# Patient Record
(demographics unavailable — no encounter records)

---

## 2024-10-18 NOTE — DISCUSSION/SUMMARY
[FreeTextEntry1] : 41 YEAR OLD FEMALE LMP 9/29/2024, ON TIME ,BUT LASTED AOBUT 2 WKS  WITH NORMAL CYCLES MONTHLY X 4 OR 5 DAYS AT THE MOST, PRESENTS FOR WELL WOMAN VISIT AND FOR EVALUATION OF THIS PRESENT BLEEDING EPISODE. PT  HAD US DONE AT [facility] TODAY WHCIH WAS "OK". LAST SEEN FOR WELL WOMAN EXAMIANTION AND PAP7/18/2023-PAP AND HPV HR TESTING DONE AT THAT TIME WERE NEGATIVE.   PT RECENTLY , 9/12/2024, HAD COVID. MEDICATIONS; ALBUTEROL INHALER PRN MEDICATION ALLERGIES; NONE ROS;BMS-NORMAL ;NO FAM HISTORY OF COLON CANCER;         NO URINARY COMPALAINTS         SEXUALLY ACTIVE  BUT NOT CURRENTLY AND NO COMPLAINTS PREVIOUSLY.                       STD BLOOD WORK DONE 7/2024 NEGATIVE. 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 +EXERCISE; + MULTIVITAMINS;+DAIRY; NO TOBACCO OR VAPING  PE;/66; TEMP 98.1; WIEGHT 132 LBS HEIGHT 5'4"       BREASTS; NO MASSES OR DISCHARGES; BIOLATERAL BREAST IMPLANTS. SCARS ON              LOWER AREOLA EDGES      ABDOMEN;SOFT, NO MASSES; NO TENDERNESS TO PALPATION      PELVIC; NORMAL EXTERNAL GENITALIA                     NORMAL URETHRAL MEATUS                     NORMAL VAGINA WITHOUT LESION                     NORMAL CERVIX                     ANTEVERTRED NORMAL UTERUS ON BIMANUAL EXAMINATION                     NO PALPABLE ADNEXAL MASSES  IMP; WELL WOMAN          ASTHMA          ? DYSF. UTERINE BLEEDING RELATED POSSIBLY TO RECENTLY HAVING HAD COVID VIRUS  PL;AN; THIN PREP PAP WITH HR HPV TESTING DONE-PT TO CALL IN 2 WKS FOR RESULTS              BREAST SELF EXAMINATION MONTHLY CONTINUED TO BE STRONGLY ADVISED              ANNUAL MAMMOGRAPHY ADVISED AND REFERRAL SCRIPT GIVEN              MENSTRUAL CALENDER GIVEN AND INSTRUCTED PT ON  USE              WILL OBSERVE FOR NEXT FEW CYCLES AND IF PERIODS CONTINUED TO BE                   PROLONGED, WILL DO FURTHRE W/U WITH SONO AND BLOOD WORK AND CONSIDER                  ENDOMETRIAL SAMPLING.

## 2025-06-19 NOTE — DISCUSSION/SUMMARY
[FreeTextEntry1] : 41 YEAR OLD FEMALE LMP 6/3/2025 WITH CYCLES WITH INTERVALS FO Q 24-25  DAYS AND LASTING 3-4 DAYS, NOT USUALLY HEAVY OR PAINFUL, PRESENTS FOR VISIT FOR STD TESTING. PT HAS BEEN WITH SAME PARTNER FOR SOME TIME BUT THERE RELATIONSHIP HAS BEEN ON AND OFF AT TIMES ( BREAK UPS AND GETTING BACK TOGETHER) AND PT WOULD LIKE STD TESTING. PT WITHOUT ANY CURRENT S/S; NO UNUSUAL VAGINAL DISCHARGES, NO BURNING, ITCHING BUT DOES HAVE AN OCCASIONAL ODOR AND HAS HAD BACTERIAL VAGINOSIS AT TIMES. PT HAS NOT BEEN INTIMATE WITH ANY OTHER PARTNER IN RECENT TIMES. 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 +EXERCISE; + MULTIVITAMINS; + DAIRY; NO TOBACCO OR VAPING  PE;/62; TEMP 97.8;WEIGHT 125 LBS; HEIGHT 5'4"       BREASTS; NO MASSES OR DISCHARGES; IMPLANTS BILATERRALY       ABDOMEN;SOFT, NO MASES; ON TENDERNESS TO PALPATION       PELVIC NORMAL EXTERNAL GENITALIA                     NORMAL URETHRAL MEATUS                     NORMAL VAGINA WITHOUT LESION                     NORMAL CERVIX; NO UNUSUAL VAGINAL DISCHARGE                     ANTEFLEXED NORMAL UTERUS ON BIMANUAL EXAMINATION                     NO PALPABLE ADNEXAL MASSES  IMP; POSSIBLE STD EXPOSURE          ANXIETY          ASTHMA  PLAN; VAGINAL CULTURE DONE-PT TO CHECK PORTAL OR CALL IN 1 WKS             STD BLOOD WORK ORDERED=- SCRIPT GIVEN AND PT TO GO NOW             SCREENING MAMMOGRPAHY ADVISED AND REFERRAL SCRIPT GIVEN